# Patient Record
Sex: FEMALE | Race: WHITE | NOT HISPANIC OR LATINO | Employment: OTHER | URBAN - NONMETROPOLITAN AREA
[De-identification: names, ages, dates, MRNs, and addresses within clinical notes are randomized per-mention and may not be internally consistent; named-entity substitution may affect disease eponyms.]

---

## 2023-04-19 PROBLEM — R73.03 PRE-DIABETES: Status: ACTIVE | Noted: 2023-04-19

## 2023-04-19 PROBLEM — R07.2 PRECORDIAL PAIN: Status: ACTIVE | Noted: 2023-04-19

## 2023-04-19 PROBLEM — F17.200 SMOKER: Status: ACTIVE | Noted: 2023-04-19

## 2023-04-19 PROBLEM — E78.5 HYPERLIPIDEMIA LDL GOAL <100: Status: ACTIVE | Noted: 2023-04-19

## 2023-04-19 RX ORDER — ROSUVASTATIN CALCIUM 10 MG/1
TABLET, COATED ORAL NIGHTLY
COMMUNITY
Start: 2023-01-24

## 2023-04-20 ENCOUNTER — OFFICE VISIT (OUTPATIENT)
Dept: CARDIOLOGY | Facility: CLINIC | Age: 43
End: 2023-04-20
Payer: COMMERCIAL

## 2023-04-20 VITALS
DIASTOLIC BLOOD PRESSURE: 77 MMHG | HEIGHT: 62 IN | WEIGHT: 191.8 LBS | SYSTOLIC BLOOD PRESSURE: 118 MMHG | HEART RATE: 81 BPM | OXYGEN SATURATION: 98 % | BODY MASS INDEX: 35.3 KG/M2

## 2023-04-20 DIAGNOSIS — R00.2 PALPITATIONS: ICD-10-CM

## 2023-04-20 DIAGNOSIS — E78.5 DYSLIPIDEMIA: Primary | ICD-10-CM

## 2023-04-20 DIAGNOSIS — R07.9 CHEST PAIN, UNSPECIFIED TYPE: ICD-10-CM

## 2023-04-20 DIAGNOSIS — R06.02 SHORTNESS OF BREATH: ICD-10-CM

## 2023-04-20 PROCEDURE — 99204 OFFICE O/P NEW MOD 45 MIN: CPT | Performed by: PHYSICIAN ASSISTANT

## 2023-04-20 NOTE — LETTER
April 20, 2023     Ector Zhao MD  [address] [place] [id number]    Patient: Elly Carnes   YOB: 1980   Date of Visit: 4/20/2023       Dear Ector Zhao MD    Elly Carnes was in my office today. Below is a copy of my note.    If you have questions, please do not hesitate to call me. I look forward to following Elly along with you.         Sincerely,        NOHEMY Herrera        CC: No Recipients    Problem list     Subjective    Elly Carnes is a 43 y.o. female     Chief Complaint   Patient presents with   • Hyperlipidemia     PT IS SENSITIVE TO MOST MEDICATIONS SHE HAS TRIED SHE STATES.        HPI    Patient is a 43-year-old female who presents to the office to be evaluated.  She has no history of coronary disease or structural heart disease.    Patient has had issues with dyslipidemia and apparently was recently placed on Crestor because of severe dyslipidemia.  Apparently Repatha was attempted but was not approved by insurance.    She has been having occasional chest discomfort feeling an atypical sharp or aching sensation in the left upper chest near her shoulder.  This seems to be sporadic without any exacerbating factor.  She does not describe any chest pressure or referral pain.    She has a degree of dyspnea that is mild if she exerts for extended levels but can do her normal routine baseline activity without issues.  She does not describe PND orthopnea.    She occasionally may sense a fluttering in her chest.  She does not describe any associated dizziness, presyncope or syncope.  She is stable otherwise.      Current Outpatient Medications on File Prior to Visit   Medication Sig Dispense Refill   • rosuvastatin (CRESTOR) 10 MG tablet Every Night.       No current facility-administered medications on file prior to visit.       Antihistamines, loratadine-type; Benadryl [diphenhydramine]; and Shellfish-derived products    Past Medical  "History:   Diagnosis Date   • Chest pain    • Hyperlipidemia LDL goal <100    • Pre-diabetes        Social History     Socioeconomic History   • Marital status: Significant Other   Tobacco Use   • Smoking status: Every Day     Types: Cigarettes   • Smokeless tobacco: Never   Substance and Sexual Activity   • Alcohol use: Never   • Drug use: Never       Family History   Problem Relation Age of Onset   • Arthritis Mother    • Stroke Father    • Hypertension Father    • Cancer Father    • COPD Father    • Coronary artery disease Father        Review of Systems   Constitutional: Negative for appetite change, chills and fever.   HENT: Negative.  Negative for dental problem, drooling, ear pain, facial swelling, sinus pressure, sneezing, sore throat and tinnitus.    Eyes: Negative for pain, redness, itching and visual disturbance.   Respiratory: Positive for shortness of breath. Negative for cough, choking and wheezing.    Cardiovascular: Positive for chest pain, palpitations and leg swelling.   Gastrointestinal: Negative for blood in stool, constipation, diarrhea and nausea.   Genitourinary: Negative.  Negative for difficulty urinating.   Musculoskeletal: Negative for arthralgias, back pain and neck pain.   Skin: Negative for rash and wound.   Neurological: Positive for numbness (LEGS). Negative for dizziness and weakness.   Psychiatric/Behavioral: Positive for sleep disturbance.       Objective    Vitals:    04/20/23 1436   BP: 118/77   Pulse: 81   SpO2: 98%   Weight: 87 kg (191 lb 12.8 oz)   Height: 157.5 cm (62\")      /77   Pulse 81   Ht 157.5 cm (62\")   Wt 87 kg (191 lb 12.8 oz)   SpO2 98%   BMI 35.08 kg/m²     Lab Results (most recent)       None            Physical Exam  Vitals and nursing note reviewed.   Constitutional:       General: She is not in acute distress.     Appearance: Normal appearance. She is well-developed.   HENT:      Head: Normocephalic and atraumatic.   Eyes:      General: No scleral " icterus.        Right eye: No discharge.         Left eye: No discharge.      Conjunctiva/sclera: Conjunctivae normal.   Neck:      Vascular: No carotid bruit.   Cardiovascular:      Rate and Rhythm: Normal rate and regular rhythm.      Heart sounds: Normal heart sounds. No murmur heard.    No friction rub. No gallop.   Pulmonary:      Effort: Pulmonary effort is normal. No respiratory distress.      Breath sounds: Normal breath sounds. No wheezing or rales.   Chest:      Chest wall: No tenderness.   Musculoskeletal:      Right lower leg: No edema.      Left lower leg: No edema.   Skin:     General: Skin is warm and dry.      Coloration: Skin is not pale.      Findings: No erythema or rash.   Neurological:      Mental Status: She is alert and oriented to person, place, and time.      Cranial Nerves: No cranial nerve deficit.   Psychiatric:         Behavior: Behavior normal.         Procedure    Procedures      Assessment & Plan     Problems Addressed this Visit          Cardiac and Vasculature    Dyslipidemia - Primary    Relevant Orders    Adult Transthoracic Echo Complete W/ Cont if Necessary Per Protocol    Treadmill Stress Test    Comprehensive Metabolic Panel    Lipid Panel    Chest pain    Relevant Orders    Adult Transthoracic Echo Complete W/ Cont if Necessary Per Protocol    Treadmill Stress Test    Comprehensive Metabolic Panel    Lipid Panel    Palpitations    Relevant Orders    Adult Transthoracic Echo Complete W/ Cont if Necessary Per Protocol    Treadmill Stress Test    Comprehensive Metabolic Panel    Lipid Panel       Pulmonary and Pneumonias    Shortness of breath    Relevant Orders    Adult Transthoracic Echo Complete W/ Cont if Necessary Per Protocol    Treadmill Stress Test    Comprehensive Metabolic Panel    Lipid Panel     Diagnoses         Codes Comments    Dyslipidemia    -  Primary ICD-10-CM: E78.5  ICD-9-CM: 272.4     Chest pain, unspecified type     ICD-10-CM: R07.9  ICD-9-CM: 786.50      Shortness of breath     ICD-10-CM: R06.02  ICD-9-CM: 786.05     Palpitations     ICD-10-CM: R00.2  ICD-9-CM: 785.1             Recommendation  1.  Patient is a 43-year-old female who presents to the office to be evaluated with complaints of chest pain, dyspnea and has occasional palpitations.  I will schedule for testing to evaluate.    2.  Regular treadmill stress test will be ordered for risk stratification in a patient with chest pain, tobacco use, and significant dyslipidemia.    3.  Echo to evaluate LV systolic and diastolic function, valvular structures etc.    4.  Palpitations are minimal at this point.  She does not describe any symptoms of malignant arrhythmia.  For now, I feel is reasonable to monitor.    5.  I want to reassess laboratories.  If her lipid status is severe or not controlled on current medical regimen is apparently increased doses of Crestor causes myalgias and she has already been on atorvastatin, hopefully We can get Repatha approved.    6.  We will see patient back for follow-up with above testing to recommend further.  She is to follow-up with primary as scheduled.          Elly Carnes  reports that she has been smoking cigarettes. She has never used smokeless tobacco..                Electronically signed by:

## 2023-04-20 NOTE — PROGRESS NOTES
Problem list     Subjective   Elly Carnes is a 43 y.o. female     Chief Complaint   Patient presents with   • Hyperlipidemia     PT IS SENSITIVE TO MOST MEDICATIONS SHE HAS TRIED SHE STATES.        HPI    Patient is a 43-year-old female who presents to the office to be evaluated.  She has no history of coronary disease or structural heart disease.    Patient has had issues with dyslipidemia and apparently was recently placed on Crestor because of severe dyslipidemia.  Apparently Repatha was attempted but was not approved by insurance.    She has been having occasional chest discomfort feeling an atypical sharp or aching sensation in the left upper chest near her shoulder.  This seems to be sporadic without any exacerbating factor.  She does not describe any chest pressure or referral pain.    She has a degree of dyspnea that is mild if she exerts for extended levels but can do her normal routine baseline activity without issues.  She does not describe PND orthopnea.    She occasionally may sense a fluttering in her chest.  She does not describe any associated dizziness, presyncope or syncope.  She is stable otherwise.      Current Outpatient Medications on File Prior to Visit   Medication Sig Dispense Refill   • rosuvastatin (CRESTOR) 10 MG tablet Every Night.       No current facility-administered medications on file prior to visit.       Antihistamines, loratadine-type; Benadryl [diphenhydramine]; and Shellfish-derived products    Past Medical History:   Diagnosis Date   • Chest pain    • Hyperlipidemia LDL goal <100    • Pre-diabetes        Social History     Socioeconomic History   • Marital status: Significant Other   Tobacco Use   • Smoking status: Every Day     Types: Cigarettes   • Smokeless tobacco: Never   Substance and Sexual Activity   • Alcohol use: Never   • Drug use: Never       Family History   Problem Relation Age of Onset   • Arthritis Mother    • Stroke Father    • Hypertension Father    •  "Cancer Father    • COPD Father    • Coronary artery disease Father        Review of Systems   Constitutional: Negative for appetite change, chills and fever.   HENT: Negative.  Negative for dental problem, drooling, ear pain, facial swelling, sinus pressure, sneezing, sore throat and tinnitus.    Eyes: Negative for pain, redness, itching and visual disturbance.   Respiratory: Positive for shortness of breath. Negative for cough, choking and wheezing.    Cardiovascular: Positive for chest pain, palpitations and leg swelling.   Gastrointestinal: Negative for blood in stool, constipation, diarrhea and nausea.   Genitourinary: Negative.  Negative for difficulty urinating.   Musculoskeletal: Negative for arthralgias, back pain and neck pain.   Skin: Negative for rash and wound.   Neurological: Positive for numbness (LEGS). Negative for dizziness and weakness.   Psychiatric/Behavioral: Positive for sleep disturbance.       Objective   Vitals:    04/20/23 1436   BP: 118/77   Pulse: 81   SpO2: 98%   Weight: 87 kg (191 lb 12.8 oz)   Height: 157.5 cm (62\")      /77   Pulse 81   Ht 157.5 cm (62\")   Wt 87 kg (191 lb 12.8 oz)   SpO2 98%   BMI 35.08 kg/m²     Lab Results (most recent)     None          Physical Exam  Vitals and nursing note reviewed.   Constitutional:       General: She is not in acute distress.     Appearance: Normal appearance. She is well-developed.   HENT:      Head: Normocephalic and atraumatic.   Eyes:      General: No scleral icterus.        Right eye: No discharge.         Left eye: No discharge.      Conjunctiva/sclera: Conjunctivae normal.   Neck:      Vascular: No carotid bruit.   Cardiovascular:      Rate and Rhythm: Normal rate and regular rhythm.      Heart sounds: Normal heart sounds. No murmur heard.    No friction rub. No gallop.   Pulmonary:      Effort: Pulmonary effort is normal. No respiratory distress.      Breath sounds: Normal breath sounds. No wheezing or rales.   Chest:      " Chest wall: No tenderness.   Musculoskeletal:      Right lower leg: No edema.      Left lower leg: No edema.   Skin:     General: Skin is warm and dry.      Coloration: Skin is not pale.      Findings: No erythema or rash.   Neurological:      Mental Status: She is alert and oriented to person, place, and time.      Cranial Nerves: No cranial nerve deficit.   Psychiatric:         Behavior: Behavior normal.         Procedure   Procedures       Assessment & Plan     Problems Addressed this Visit        Cardiac and Vasculature    Dyslipidemia - Primary    Relevant Orders    Adult Transthoracic Echo Complete W/ Cont if Necessary Per Protocol    Treadmill Stress Test    Comprehensive Metabolic Panel    Lipid Panel    Chest pain    Relevant Orders    Adult Transthoracic Echo Complete W/ Cont if Necessary Per Protocol    Treadmill Stress Test    Comprehensive Metabolic Panel    Lipid Panel    Palpitations    Relevant Orders    Adult Transthoracic Echo Complete W/ Cont if Necessary Per Protocol    Treadmill Stress Test    Comprehensive Metabolic Panel    Lipid Panel       Pulmonary and Pneumonias    Shortness of breath    Relevant Orders    Adult Transthoracic Echo Complete W/ Cont if Necessary Per Protocol    Treadmill Stress Test    Comprehensive Metabolic Panel    Lipid Panel   Diagnoses       Codes Comments    Dyslipidemia    -  Primary ICD-10-CM: E78.5  ICD-9-CM: 272.4     Chest pain, unspecified type     ICD-10-CM: R07.9  ICD-9-CM: 786.50     Shortness of breath     ICD-10-CM: R06.02  ICD-9-CM: 786.05     Palpitations     ICD-10-CM: R00.2  ICD-9-CM: 785.1           Recommendation  1.  Patient is a 43-year-old female who presents to the office to be evaluated with complaints of chest pain, dyspnea and has occasional palpitations.  I will schedule for testing to evaluate.    2.  Regular treadmill stress test will be ordered for risk stratification in a patient with chest pain, tobacco use, and significant  dyslipidemia.    3.  Echo to evaluate LV systolic and diastolic function, valvular structures etc.    4.  Palpitations are minimal at this point.  She does not describe any symptoms of malignant arrhythmia.  For now, I feel is reasonable to monitor.    5.  I want to reassess laboratories.  If her lipid status is severe or not controlled on current medical regimen is apparently increased doses of Crestor causes myalgias and she has already been on atorvastatin, hopefully We can get Repatha approved.    6.  We will see patient back for follow-up with above testing to recommend further.  She is to follow-up with primary as scheduled.           Elly Carnes  reports that she has been smoking cigarettes. She has never used smokeless tobacco..                Electronically signed by:

## 2023-04-25 ENCOUNTER — TELEPHONE (OUTPATIENT)
Dept: CARDIOLOGY | Facility: CLINIC | Age: 43
End: 2023-04-25
Payer: COMMERCIAL

## 2023-04-25 ENCOUNTER — PRIOR AUTHORIZATION (OUTPATIENT)
Dept: CARDIOLOGY | Facility: CLINIC | Age: 43
End: 2023-04-25
Payer: COMMERCIAL

## 2023-04-25 DIAGNOSIS — E78.5 DYSLIPIDEMIA: Primary | ICD-10-CM

## 2023-04-25 NOTE — TELEPHONE ENCOUNTER
Praluent is formulary with [employer] insurance. OK per Chalino Malloy PA-C. Patient given results. She will repeat labs in 6-8 weeks.     ----- Message from NOHEMY Melchor sent at 4/25/2023  1:18 PM EDT -----  Can we get Repatha on this patient

## 2023-04-25 NOTE — TELEPHONE ENCOUNTER
Prior authorization initiated and submitted through [employer]. Status pending.      The request has been approved. The authorization is effective for a maximum of 6 fills from 04/25/2023 to 10/24/2023, as long as the member is enrolled in their current health plan. The request was approved as submitted. A written notification letter will follow with additional details.    [facility] quoted $0, patient made aware.

## 2023-05-23 ENCOUNTER — HOSPITAL ENCOUNTER (OUTPATIENT)
Dept: CARDIOLOGY | Facility: HOSPITAL | Age: 43
Discharge: HOME | End: 2023-05-23
Payer: COMMERCIAL

## 2023-05-23 DIAGNOSIS — E78.5 DYSLIPIDEMIA: ICD-10-CM

## 2023-05-23 DIAGNOSIS — R06.02 SHORTNESS OF BREATH: ICD-10-CM

## 2023-05-23 DIAGNOSIS — R07.9 CHEST PAIN, UNSPECIFIED TYPE: ICD-10-CM

## 2023-05-23 DIAGNOSIS — R00.2 PALPITATIONS: ICD-10-CM

## 2023-05-23 LAB
BH CV ECHO MEAS - ACS: 1.81 CM
BH CV ECHO MEAS - AO MAX PG: 8 MMHG
BH CV ECHO MEAS - AO MEAN PG: 4.7 MMHG
BH CV ECHO MEAS - AO ROOT DIAM: 2.7 CM
BH CV ECHO MEAS - AO V2 MAX: 141.2 CM/SEC
BH CV ECHO MEAS - AO V2 VTI: 32.5 CM
BH CV ECHO MEAS - EDV(CUBED): 63.5 ML
BH CV ECHO MEAS - EDV(MOD-SP4): 90.7 ML
BH CV ECHO MEAS - EF(MOD-SP4): 63.1 %
BH CV ECHO MEAS - EF_3D-VOL: 44 %
BH CV ECHO MEAS - ESV(CUBED): 17 ML
BH CV ECHO MEAS - ESV(MOD-SP4): 33.5 ML
BH CV ECHO MEAS - FS: 35.6 %
BH CV ECHO MEAS - IVS/LVPW: 0.99 CM
BH CV ECHO MEAS - IVSD: 0.85 CM
BH CV ECHO MEAS - LA DIMENSION: 3.2 CM
BH CV ECHO MEAS - LAT PEAK E' VEL: 14.4 CM/SEC
BH CV ECHO MEAS - LV DIASTOLIC VOL/BSA (35-75): 48.4 CM2
BH CV ECHO MEAS - LV MASS(C)D: 101 GRAMS
BH CV ECHO MEAS - LV SYSTOLIC VOL/BSA (12-30): 17.9 CM2
BH CV ECHO MEAS - LVIDD: 4 CM
BH CV ECHO MEAS - LVIDS: 2.6 CM
BH CV ECHO MEAS - LVPWD: 0.85 CM
BH CV ECHO MEAS - MED PEAK E' VEL: 9.5 CM/SEC
BH CV ECHO MEAS - MV A MAX VEL: 79.7 CM/SEC
BH CV ECHO MEAS - MV DEC SLOPE: 643.2 CM/SEC2
BH CV ECHO MEAS - MV E MAX VEL: 95.1 CM/SEC
BH CV ECHO MEAS - MV E/A: 1.19
BH CV ECHO MEAS - RVDD: 3.1 CM
BH CV ECHO MEAS - SI(MOD-SP4): 30.5 ML/M2
BH CV ECHO MEAS - SV(MOD-SP4): 57.2 ML
BH CV ECHO MEASUREMENTS AVERAGE E/E' RATIO: 7.96
BH CV STRESS COMMENTS STAGE 1: NORMAL
BH CV STRESS DOSE REGADENOSON STAGE 1: 0.4
BH CV STRESS DURATION MIN STAGE 1: 3
BH CV STRESS DURATION SEC STAGE 1: 0
BH CV STRESS GRADE STAGE 1: 10
BH CV STRESS METS STAGE 1: 5
BH CV STRESS PROTOCOL 1: NORMAL
BH CV STRESS RECOVERY BP: NORMAL MMHG
BH CV STRESS RECOVERY HR: 100 BPM
BH CV STRESS SPEED STAGE 1: 1.7
BH CV STRESS STAGE 1: 1
LEFT ATRIUM VOLUME INDEX: 20.7 ML/M2
MAXIMAL PREDICTED HEART RATE: 178 BPM
MAXIMAL PREDICTED HEART RATE: 178 BPM
PERCENT MAX PREDICTED HR: 100.56 %
STRESS BASELINE BP: NORMAL MMHG
STRESS BASELINE HR: 96 BPM
STRESS PERCENT HR: 118 %
STRESS POST ESTIMATED WORKLOAD: 10.1 METS
STRESS POST EXERCISE DUR MIN: 9 MIN
STRESS POST PEAK BP: NORMAL MMHG
STRESS POST PEAK HR: 179 BPM
STRESS TARGET HR: 151 BPM
STRESS TARGET HR: 151 BPM

## 2023-05-23 PROCEDURE — 93017 CV STRESS TEST TRACING ONLY: CPT

## 2023-05-23 PROCEDURE — 93306 TTE W/DOPPLER COMPLETE: CPT

## 2023-05-25 ENCOUNTER — TELEPHONE (OUTPATIENT)
Dept: CARDIOLOGY | Facility: CLINIC | Age: 43
End: 2023-05-25
Payer: COMMERCIAL

## 2023-05-25 NOTE — TELEPHONE ENCOUNTER
STRESS  Pt notified of no acute findings. Provider will discuss results at f/u. Pt reminded of appt date and time.  ----- Message from NOHEMY Melchor sent at 5/24/2023  3:36 PM EDT -----  Routine follow-up

## 2023-05-31 LAB
BH CV ECHO MEAS - ACS: 1.81 CM
BH CV ECHO MEAS - AO MAX PG: 8 MMHG
BH CV ECHO MEAS - AO MEAN PG: 4.7 MMHG
BH CV ECHO MEAS - AO ROOT DIAM: 2.7 CM
BH CV ECHO MEAS - AO V2 MAX: 141.2 CM/SEC
BH CV ECHO MEAS - AO V2 VTI: 32.5 CM
BH CV ECHO MEAS - EDV(CUBED): 63.5 ML
BH CV ECHO MEAS - EDV(MOD-SP4): 90.7 ML
BH CV ECHO MEAS - EF(MOD-SP4): 63.1 %
BH CV ECHO MEAS - EF_3D-VOL: 44 %
BH CV ECHO MEAS - ESV(CUBED): 17 ML
BH CV ECHO MEAS - ESV(MOD-SP4): 33.5 ML
BH CV ECHO MEAS - FS: 35.6 %
BH CV ECHO MEAS - IVS/LVPW: 0.99 CM
BH CV ECHO MEAS - IVSD: 0.85 CM
BH CV ECHO MEAS - LA DIMENSION: 3.2 CM
BH CV ECHO MEAS - LAT PEAK E' VEL: 14.4 CM/SEC
BH CV ECHO MEAS - LV DIASTOLIC VOL/BSA (35-75): 48.4 CM2
BH CV ECHO MEAS - LV MASS(C)D: 101 GRAMS
BH CV ECHO MEAS - LV SYSTOLIC VOL/BSA (12-30): 17.9 CM2
BH CV ECHO MEAS - LVIDD: 4 CM
BH CV ECHO MEAS - LVIDS: 2.6 CM
BH CV ECHO MEAS - LVPWD: 0.85 CM
BH CV ECHO MEAS - MED PEAK E' VEL: 9.5 CM/SEC
BH CV ECHO MEAS - MV A MAX VEL: 79.7 CM/SEC
BH CV ECHO MEAS - MV DEC SLOPE: 643.2 CM/SEC2
BH CV ECHO MEAS - MV E MAX VEL: 95.1 CM/SEC
BH CV ECHO MEAS - MV E/A: 1.19
BH CV ECHO MEAS - RVDD: 3.1 CM
BH CV ECHO MEAS - SI(MOD-SP4): 30.5 ML/M2
BH CV ECHO MEAS - SV(MOD-SP4): 57.2 ML
BH CV ECHO MEASUREMENTS AVERAGE E/E' RATIO: 7.96
LEFT ATRIUM VOLUME INDEX: 20.7 ML/M2
MAXIMAL PREDICTED HEART RATE: 178 BPM
STRESS TARGET HR: 151 BPM

## 2023-06-01 ENCOUNTER — TELEPHONE (OUTPATIENT)
Dept: CARDIOLOGY | Facility: CLINIC | Age: 43
End: 2023-06-01

## 2023-06-01 NOTE — TELEPHONE ENCOUNTER
For the HUB to read to pt:    ECHO  Called patient to notify of no acute findings or abnormalities. Keep follow up as scheduled. If you have any problem between now and then give our office a call.   ----- Message from NOHEMY Melchor sent at 6/1/2023  1:55 PM EDT -----  Routine follow-up

## 2023-08-24 ENCOUNTER — HOSPITAL ENCOUNTER (OUTPATIENT)
Dept: MRI IMAGING | Facility: HOSPITAL | Age: 43
Discharge: HOME | End: 2023-08-24
Admitting: NURSE PRACTITIONER
Payer: COMMERCIAL

## 2023-08-24 DIAGNOSIS — M62.50 MUSCULAR ATROPHY, UNSPECIFIED SITE: ICD-10-CM

## 2023-08-24 DIAGNOSIS — R53.83 FATIGUE, UNSPECIFIED TYPE: ICD-10-CM

## 2023-08-24 DIAGNOSIS — R29.898 WEAKNESS OF BOTH LEGS: ICD-10-CM

## 2023-08-24 DIAGNOSIS — R93.0 ABNORMAL MRI OF HEAD: ICD-10-CM

## 2023-08-24 DIAGNOSIS — R20.0 BILATERAL NUMBNESS AND TINGLING OF ARMS AND LEGS: ICD-10-CM

## 2023-08-24 DIAGNOSIS — R20.2 BILATERAL NUMBNESS AND TINGLING OF ARMS AND LEGS: ICD-10-CM

## 2023-08-24 DIAGNOSIS — R29.898 WEAKNESS OF BOTH LEGS: Primary | ICD-10-CM

## 2023-08-24 PROCEDURE — 70553 MRI BRAIN STEM W/O & W/DYE: CPT

## 2023-08-24 PROCEDURE — 0 GADOBENATE DIMEGLUMINE 529 MG/ML SOLUTION: Performed by: NURSE PRACTITIONER

## 2023-08-24 PROCEDURE — A9577 INJ MULTIHANCE: HCPCS | Performed by: NURSE PRACTITIONER

## 2023-08-24 RX ADMIN — GADOBENATE DIMEGLUMINE 15 ML: 529 INJECTION, SOLUTION INTRAVENOUS at 12:34

## 2023-10-18 ENCOUNTER — PROCEDURE VISIT (OUTPATIENT)
Dept: NEUROLOGY | Facility: CLINIC | Age: 43
End: 2023-10-18
Payer: COMMERCIAL

## 2023-10-18 VITALS — HEIGHT: 62 IN | BODY MASS INDEX: 35.29 KG/M2

## 2023-10-18 DIAGNOSIS — R20.2 BILATERAL NUMBNESS AND TINGLING OF ARMS AND LEGS: ICD-10-CM

## 2023-10-18 DIAGNOSIS — R29.898 WEAKNESS OF BOTH LEGS: ICD-10-CM

## 2023-10-18 DIAGNOSIS — M62.50 MUSCULAR ATROPHY, UNSPECIFIED SITE: ICD-10-CM

## 2023-10-18 DIAGNOSIS — R93.0 ABNORMAL MRI OF HEAD: ICD-10-CM

## 2023-10-18 DIAGNOSIS — R20.0 BILATERAL NUMBNESS AND TINGLING OF ARMS AND LEGS: ICD-10-CM

## 2023-10-18 NOTE — PROGRESS NOTES
[facility]   Electrodiagnostic Laboratory    Nerve Conduction & EMG Report        Patient:   Elly Carnes   Patient ID: [id number]   YOB: 1980  Sex:   female      Exam Physician:  Davis Long MD  Refer Physician:  LIZANDRO Celeste    Electromyogram and Nerve Conduction Velocity Procedure Note    Hx: 43 y.o. right handed female with complaint of numbness involving the the upper and lower extremities and weakness involving the the upper and lower extremities. Symptoms have been present for several years and were provoked by activity. Significant past medical history includes nothing suggestive of neuropathy Family history no family history of nerve or muscle disease.    Exam: Motor power is normal. There is no atrophy. There are no fasciculations. Deep tendon reflexes are present and symmetrical. Sensory exam is normal.      Edx studies of the B UE and LE were performed to evaluate for peripheral neuropathy.      NCS Examination   For sensory nerve conduction studies, the amplitude is measured peak-to-peak, the latency reported is the distal peak latency, and the conduction velocity, if measured, is determined from onset latencies and is over the forearm.   For motor nerve conduction studies, the amplitude is measured baseline-to-peak, the latency reported is the distal onset latency, the conduction velocity is calculated over the forearm, and the F wave latency is the minimum latency.   Unless otherwise noted, the hand temperature was monitored continuously and remained between 32°C and 36°C during the performance of the NCSs.            Nerve Conduction Studies  Anti Sensory Summary Table     Stim Site NR Norm Peak (ms) O-P Amp (µV) Norm O-P Amp Onset (ms) Site1 Site2 Delta-0 (ms) Dist (cm) Ar (m/s) Norm Ar (m/s)   Left Median Anti Sensory (2nd Digit)   Wrist    <3.6 45.9 >10 2.3 Wrist 2nd Digit 2.3 14.0 61    Right Median Anti Sensory (2nd Digit)   Wrist    <3.6  43.4 >10 2.6 Wrist 2nd Digit 2.6 14.0 54    Left Radial Anti Sensory (Base 1st Digit)   Wrist    <3.1 36.9  1.1 Wrist Base 1st Digit 1.1 0.0     Right Radial Anti Sensory (Base 1st Digit)   Wrist    <3.1 31.9  1.1 Wrist Base 1st Digit 1.1 0.0     Left Sural Anti Sensory (Lat Mall)   Calf    <4.0 5.6 >5.0 1.8 Calf Lat Mall 1.8 14.0 78    Right Sural Anti Sensory (Lat Mall)   Calf    <4.0 33.8 >5.0 1.6 Calf Lat Mall 1.6 14.0 88    Left Ulnar Anti Sensory (5th Digit)   Wrist    <3.7 39.2 >15.0 2.1 Wrist 5th Digit 2.1 0.0     Right Ulnar Anti Sensory (5th Digit)   Wrist    <3.7 24.7 >15.0 2.0 Wrist 5th Digit 2.0 0.0       Motor Summary Table     Stim Site NR Onset (ms) Norm Onset (ms) O-P Amp (mV) Norm O-P Amp Site1 Site2 Delta-0 (ms) Dist (cm) Ar (m/s) Norm Ar (m/s)   Left Fibular Motor (Ext Dig Brev)   Ankle    3.2 <6.1 4.9 >2.5 B Fib Ankle 6.6 34.0 52 >40   B Fib    9.8  4.3  Poplt B Fib 1.6 9.0 56 >40   Poplt    11.4  3.9          Right Fibular Motor (Ext Dig Brev)   Ankle    3.5 <6.1 3.8 >2.5 B Fib Ankle 6.4 30.0 47 >40   B Fib    9.9  3.4  Poplt B Fib 1.7 9.0 53 >40   Poplt    11.6  3.5          Left Median Motor (Abd Poll Brev)   Wrist    3.3 <4.2 7.2 >5 Elbow Wrist 3.5 19.0 54 >50   Elbow    6.8  4.4          Right Median Motor (Abd Poll Brev)   Wrist    3.4 <4.2 6.5 >5 Elbow Wrist 3.6 19.0 53 >50   Elbow    7.0  1.1          Left Tibial Motor (Abd Girard Brev)   Ankle    3.8 <6.1 10.0 >3.0 Knee Ankle 8.9 38.0 43 >40   Knee    12.7  5.3          Right Tibial Motor (Abd Girard Brev)   Ankle    4.3 <6.1 6.8 >3.0 Knee Ankle 7.2 39.0 54 >40   Knee    11.5  6.5          Left Ulnar Motor (Abd Dig Minimi)   Wrist    2.8 <4.2 9.7 >3 B Elbow Wrist 3.5 21.0 60 >53   B Elbow    6.3  6.1  A Elbow B Elbow 1.5 9.0 60 >53   A Elbow    7.8  8.2          Right Ulnar Motor (Abd Dig Minimi)   Wrist    2.7 <4.2 8.3 >3 B Elbow Wrist 4.0 21.0 53 >53   B Elbow    6.7  5.2  A Elbow B Elbow 1.6 9.0 56 >53   A Elbow    8.3  7.7             F Wave Studies     NR F-Lat (ms) Lat Norm (ms) L-R F-Lat (ms) L-R Lat Norm   Left Fibular (Mrkrs) (EDB)      50.16 <60 1.09 <5.1   Right Fibular (Mrkrs) (EDB)      51.25 <60 1.09 <5.1   Left Median (Mrkrs) (Abd Poll Brev)      25.00 <33 1.80 <2.2   Right Median (Mrkrs) (Abd Poll Brev)      26.80 <33 1.80 <2.2   Left Tibial (Mrkrs) (Abd Hallucis)      57.66 <61 0.41 <5.7   Right Tibial (Mrkrs) (Abd Hallucis)      57.25 <61 0.41 <5.7   Left Ulnar (Mrkrs) (Abd Dig Min)      25.94 <36 *3.83 <2.5   Right Ulnar (Mrkrs) (Abd Dig Min)      29.77 <36 *3.83 <2.5     H Reflex Studies     NR H-Lat (ms) L-R H-Lat (ms) L-R Lat Norm   Left Tibial (Mrkrs) (Gastroc)      30.85 0.00 <2.0   Right Tibial (Mrkrs) (Gastroc)      30.85 0.00 <2.0         EMG Examination   The study was performed with a concentric needle electrode. Fibrillation and fasciculation activity is graded from none (0) to continuous (4+). The configuration and recruitment pattern of motor unit action potentials under voluntary control, if not normal, are described below       Side Muscle Nerve Root Ins Act Fibs Psw Amp Dur Poly Recrt Int Pat Comment   Right Deltoid Axillary C5-6 Nml Nml Nml Nml Nml 0 Nml Nml    Right Triceps Radial C6-7-8 Nml Nml Nml Nml Nml 0 Nml Nml    Right Biceps Musculocut C5-6 Nml Nml Nml Nml Nml 0 Nml Nml    Right PronatorTeres Median C6-7 Nml Nml Nml Nml Nml 0 Nml Nml    Right 1stDorInt Ulnar C8-T1 Nml Nml Nml Nml Nml 0 Nml Nml    Left Deltoid Axillary C5-6 Nml Nml Nml Nml Nml 0 Nml Nml    Left Triceps Radial C6-7-8 Nml Nml Nml Nml Nml 0 Nml Nml    Left Biceps Musculocut C5-6 Nml Nml Nml Nml Nml 0 Nml Nml    Left PronatorTeres Median C6-7 Nml Nml Nml Nml Nml 0 Nml Nml    Left 1stDorInt Ulnar C8-T1 Nml Nml Nml Nml Nml 0 Nml Nml    Right AntTibialis Dp Br Fibular L4-5 Nml Nml Nml Nml Nml 0 Nml Nml    Right Gastroc Tibial S1-2 Nml Nml Nml Nml Nml 0 Nml Nml    Right BicepsFemL Sciatic L5-S2 Nml Nml Nml Nml Nml 0 Nml Nml    Right VastusMed  Femoral L2-4 Nml Nml Nml Nml Nml 0 Nml Nml    Right Iliopsoas Femoral L2-3 Nml Nml Nml Nml Nml 0 Nml Nml    Left AntTibialis Dp Br Fibular L4-5 Nml Nml Nml Nml Nml 0 Nml Nml    Left Gastroc Tibial S1-2 Nml Nml Nml Nml Nml 0 Nml Nml    Left BicepsFemL Sciatic L5-S2 Nml Nml Nml Nml Nml 0 Nml Nml    Left VastusMed Femoral L2-4 Nml Nml Nml Nml Nml 0 Nml Nml    Left Iliopsoas Femoral L2-3 Nml Nml Nml Nml Nml 0 Nml Nml        Nerve Conduction Studies  Anti Sensory Left/Right Comparison     Stim Site L Lat (ms) R Lat (ms) L-R Lat (ms) L Amp (µV) R Amp (µV) L-R Amp (%) Site1 Site2 L Ar (m/s) R Ar (m/s) L-R Ar (m/s)   Median Anti Sensory (2nd Digit)   Wrist 2.3 2.6 0.3 45.9 43.4 5.4 Wrist 2nd Digit 61 54 7   Radial Anti Sensory (Base 1st Digit)   Wrist 1.1 1.1 0.0 36.9 31.9 13.6 Wrist Base 1st Digit      Sural Anti Sensory (Lat Mall)   Calf 1.8 1.6 0.2 5.6 33.8 83.4 Calf Lat Mall 78 88 10   Ulnar Anti Sensory (5th Digit)   Wrist 2.1 2.0 0.1 39.2 24.7 37.0 Wrist 5th Digit        Motor Left/Right Comparison     Stim Site L Lat (ms) R Lat (ms) L-R Lat (ms) L Amp (mV) R Amp (mV) L-R Amp (%) Site1 Site2 L Ar (m/s) R Ar (m/s) L-R Ar (m/s)   Fibular Motor (Ext Dig Brev)   Ankle 3.2 3.5 0.3 4.9 3.8 22.4 B Fib Ankle 52 47 5   B Fib 9.8 9.9 0.1 4.3 3.4 20.9 Poplt B Fib 56 53 3   Poplt 11.4 11.6 0.2 3.9 3.5 10.3        Median Motor (Abd Poll Brev)   Wrist 3.3 3.4 0.1 7.2 6.5 9.7 Elbow Wrist 54 53 1   Elbow 6.8 7.0 0.2 4.4 1.1 75.0        Tibial Motor (Abd Girard Brev)   Ankle 3.8 4.3 0.5 10.0 6.8 32.0 Knee Ankle 43 54 *11   Knee 12.7 11.5 1.2 5.3 6.5 18.5        Ulnar Motor (Abd Dig Minimi)   Wrist 2.8 2.7 0.1 9.7 8.3 14.4 B Elbow Wrist 60 53 7   B Elbow 6.3 6.7 0.4 6.1 5.2 14.8 A Elbow B Elbow 60 56 4   A Elbow 7.8 8.3 0.5 8.2 7.7 6.1              Waveforms:                                                                                       NCV FINDINGS:  All nerve conduction studies (as indicated in the following tables) were  within normal limits.  All remaining left vs. right side differences were within normal limits.  All F Wave latencies were within normal limits.  All remaining F Wave left vs. right side latency differences were within normal limits.  All H Reflex left vs. right side latency differences were within normal limits.      EMG FINDINGS:    All examined muscles (as indicated in the following table) showed no evidence of electrical instability.       Conclusion: Normal NCV and EMG of the upper extremities, right lower extremity, and left lower extremity          Instrument used:  Teca Synergy        Performed by:          Davis Long MD

## 2023-11-09 ENCOUNTER — OFFICE VISIT (OUTPATIENT)
Dept: CARDIOLOGY | Facility: CLINIC | Age: 43
End: 2023-11-09
Payer: COMMERCIAL

## 2023-11-09 VITALS
WEIGHT: 196.6 LBS | HEART RATE: 75 BPM | DIASTOLIC BLOOD PRESSURE: 78 MMHG | HEIGHT: 62 IN | OXYGEN SATURATION: 98 % | BODY MASS INDEX: 36.18 KG/M2 | SYSTOLIC BLOOD PRESSURE: 109 MMHG

## 2023-11-09 DIAGNOSIS — R07.2 PRECORDIAL PAIN: Primary | ICD-10-CM

## 2023-11-09 DIAGNOSIS — R06.02 SHORTNESS OF BREATH: ICD-10-CM

## 2023-11-09 DIAGNOSIS — F17.200 SMOKER: ICD-10-CM

## 2023-11-09 DIAGNOSIS — R00.2 PALPITATIONS: ICD-10-CM

## 2023-11-09 DIAGNOSIS — E78.5 DYSLIPIDEMIA: ICD-10-CM

## 2023-11-09 PROCEDURE — 99214 OFFICE O/P EST MOD 30 MIN: CPT | Performed by: INTERNAL MEDICINE

## 2023-11-09 NOTE — PROGRESS NOTES
Subjective   Elly Carnes is a 43 y.o. female     Chief Complaint   Patient presents with    Follow-up     Here for 6 mo. Testing f/u    Hyperlipidemia    Palpitations       PROBLEM LIST:     Chest pain  1.1 Stress test, 5-,   Shortness of breath  Palpitations  Hyperlipidemia  Smoker  Echo, 5-, EF 55-60%, trivial MR / AI        Specialty Problems          Cardiology Problems    Palpitations             HPI:    Ms. Carnes returns for follow-up on the above.    She has been stable from the cardiac perspective.  She has occasional episodes of chest discomfort atypical for angina versus nonanginal in nature.  Her functional capacity has remained unchanged despite multiple neurologic issues including what she feels to be significant muscle loss.  She has no orthopnea, PND, or lower extremity edema.  She senses no palpitations.  She has not been presyncopal or syncopal.  She does not claudicate or have symptoms of TIA or stroke.    Stress test demonstrated above average functional capacity (9 minutes on a Pascual protocol) with physiologic heart rate and blood pressure responses to stress, no chest pain, and no EKG evidence of ischemia or dysrhythmia.    Echocardiogram demonstrated normal LV systolic and diastolic function, normal LV filling pressures, normal pulmonary pressures.  There is no valve, pericardial, or great vessel pathology identified.    The patient is tolerating PCSK9 inhibitor therapy without difficulty.  LDL of 148 previously on statin was on to 76 on combined therapy.                        PRIOR MEDICATIONS    Current Outpatient Medications on File Prior to Visit   Medication Sig Dispense Refill    Alirocumab 75 MG/ML solution auto-injector Inject 1 mL under the skin into the appropriate area as directed Every 14 (Fourteen) Days. 2.24 mL 11    B Complex-C-Folic Acid (multivitamin b complex with c) tablet Take 1 tablet by mouth Daily.      meloxicam (MOBIC) 7.5 MG tablet Take 1  tablet by mouth Daily As Needed.      methocarbamol (ROBAXIN) 750 MG tablet Take 1 tablet by mouth 3 (Three) Times a Day As Needed.      rosuvastatin (CRESTOR) 10 MG tablet Every Night.       No current facility-administered medications on file prior to visit.       ALLERGIES:    Antihistamines, loratadine-type; Benadryl [diphenhydramine]; Epinephrine; and Shellfish-derived products    PAST MEDICAL HISTORY:    Past Medical History:   Diagnosis Date    Chest pain     CTS (carpal tunnel syndrome) 06/2017    had EMG    Familial hypercholesteremia     Headache, tension-type     Hyperlipidemia LDL goal <100     Peripheral neuropathy     Pre-diabetes     Weakness of both legs 10/18/2023       SURGICAL HISTORY:    History reviewed. No pertinent surgical history.    SOCIAL HISTORY:    Social History     Socioeconomic History    Marital status: Significant Other   Tobacco Use    Smoking status: Every Day     Packs/day: 0.50     Years: 26.00     Additional pack years: 0.00     Total pack years: 13.00     Types: Cigarettes     Passive exposure: Current    Smokeless tobacco: Never   Vaping Use    Vaping Use: Never used   Substance and Sexual Activity    Alcohol use: Yes     Alcohol/week: 4.0 standard drinks of alcohol     Types: 4 Drinks containing 0.5 oz of alcohol per week    Drug use: Never    Sexual activity: Yes     Partners: Male     Birth control/protection: Condom       FAMILY HISTORY:    Family History   Problem Relation Age of Onset    Arthritis Mother     Stroke Father     Hypertension Father     Cancer Father     COPD Father     Coronary artery disease Father     Seizures Father        Review of Systems   Constitutional:  Positive for fatigue.   HENT: Negative.     Eyes:  Positive for visual disturbance (glasses).   Respiratory: Negative.     Cardiovascular:  Positive for chest pain (occas.) and leg swelling (occas.). Negative for palpitations.   Gastrointestinal: Negative.    Endocrine: Negative.    Genitourinary:  "Negative.    Musculoskeletal:  Positive for arthralgias and myalgias.   Skin: Negative.    Allergic/Immunologic: Positive for food allergies (shellfish).   Neurological: Negative.    Hematological:  Bruises/bleeds easily.   Psychiatric/Behavioral:  Positive for sleep disturbance.        VISIT VITALS:  Vitals:    11/09/23 0932   BP: 109/78   BP Location: Left arm   Patient Position: Sitting   Pulse: 75   SpO2: 98%   Weight: 89.2 kg (196 lb 9.6 oz)   Height: 157.5 cm (62.01\")      /78 (BP Location: Left arm, Patient Position: Sitting)   Pulse 75   Ht 157.5 cm (62.01\")   Wt 89.2 kg (196 lb 9.6 oz)   SpO2 98%   BMI 35.95 kg/m²     RECENT LABS:    Objective       Physical Exam  Vitals and nursing note reviewed.   Constitutional:       General: She is not in acute distress.     Appearance: She is well-developed.   HENT:      Head: Normocephalic and atraumatic.   Eyes:      Conjunctiva/sclera: Conjunctivae normal.      Pupils: Pupils are equal, round, and reactive to light.   Neck:      Vascular: No carotid bruit, hepatojugular reflux or JVD.      Trachea: No tracheal deviation.      Comments: Nl. Carotid upstrokes  Cardiovascular:      Rate and Rhythm: Normal rate and regular rhythm.      Pulses:           Radial pulses are 2+ on the right side and 2+ on the left side.      Heart sounds: S1 normal and S2 normal. Murmur heard.      No friction rub. S4 sounds present. No S3 sounds.      Comments: 1/6 TR  No MR  No AI  Pulmonary:      Effort: Pulmonary effort is normal.      Breath sounds: Normal breath sounds. No wheezing, rhonchi or rales.      Comments: Nl. Expir. Phase  Nl. Breath sound intensity    Abdominal:      General: Bowel sounds are normal. There is no distension or abdominal bruit.      Palpations: Abdomen is soft. There is no mass.      Tenderness: There is no abdominal tenderness. There is no guarding or rebound.      Comments: No organomegaly   Musculoskeletal:         General: No tenderness or " deformity. Normal range of motion.      Cervical back: Normal range of motion and neck supple.      Right lower leg: No edema.      Left lower leg: No edema.      Comments: LLE, no edema, palpable pedal pulses  RLE, no edema, palpable pedal pulses   Skin:     General: Skin is warm and dry.      Coloration: Skin is not pale.      Findings: No erythema or rash.   Neurological:      Mental Status: She is alert and oriented to person, place, and time.   Psychiatric:         Behavior: Behavior normal.         Thought Content: Thought content normal.         Judgment: Judgment normal.         Procedures      Assessment & Plan   #1.  Chest pain atypical for angina.  The patient had low risk stress testing and has stable high-level functional capacity.  We will continue risk factor modification but will reserve further risk assessment for significant change in symptoms.    2.  Palpitations by history.  The patient states she has had no symptoms.  We will continue to monitor.    3.  Dyslipidemia.  LDL cholesterol is at goal.    4.  Multiple neurologic issues currently being   Diagnosis Plan   1. Precordial pain        2. Dyslipidemia        3. Palpitations        4. Shortness of breath        5. Smoker            No follow-ups on file.         Elly Carnes  reports that she has been smoking cigarettes. She has a 13.00 pack-year smoking history. She has been exposed to tobacco smoke. She has never used smokeless tobacco.. I have educated her on the risk of diseases from using tobacco products such as cancer, COPD, and heart disease.     I advised her to quit and she is not willing to quit.    I spent  3  minutes counseling the patient.          Class 2 Severe Obesity (BMI >=35 and <=39.9). Obesity-related health conditions include the following: dyslipidemias. Obesity is  to be assessed at today's visit . BMI is  pcp addressing . We discussed portion control and increasing exercise.               Electronically signed  by:    Scribed for Rod Kerns MD by Nyla Daly LPN on November 9, 2023  at 09:36 EST    I, Rod Kerns MD personally performed the services described in this documentation as scribed by the above named individual in my presence, and it is both accurate and complete. November 9, 2023 09:36 EST      Dictated Utilizing Dragon Dictation: Part of this note may be an electronic transcription/translation of spoken language to printed text using the Dragon Dictation System.

## 2023-11-17 ENCOUNTER — OFFICE VISIT (OUTPATIENT)
Dept: NEUROLOGY | Facility: CLINIC | Age: 43
End: 2023-11-17
Payer: COMMERCIAL

## 2023-11-17 VITALS
SYSTOLIC BLOOD PRESSURE: 118 MMHG | DIASTOLIC BLOOD PRESSURE: 68 MMHG | OXYGEN SATURATION: 98 % | HEIGHT: 62 IN | WEIGHT: 196.4 LBS | HEART RATE: 100 BPM | BODY MASS INDEX: 36.14 KG/M2

## 2023-11-17 DIAGNOSIS — R20.2 BILATERAL NUMBNESS AND TINGLING OF ARMS AND LEGS: ICD-10-CM

## 2023-11-17 DIAGNOSIS — R29.898 WEAKNESS OF BOTH LEGS: Primary | ICD-10-CM

## 2023-11-17 DIAGNOSIS — R20.0 BILATERAL NUMBNESS AND TINGLING OF ARMS AND LEGS: ICD-10-CM

## 2023-11-17 PROCEDURE — 99214 OFFICE O/P EST MOD 30 MIN: CPT | Performed by: PSYCHIATRY & NEUROLOGY

## 2023-11-17 PROCEDURE — 1160F RVW MEDS BY RX/DR IN RCRD: CPT | Performed by: PSYCHIATRY & NEUROLOGY

## 2023-11-17 PROCEDURE — 1159F MED LIST DOCD IN RCRD: CPT | Performed by: PSYCHIATRY & NEUROLOGY

## 2023-11-17 RX ORDER — PRAMIPEXOLE DIHYDROCHLORIDE 0.25 MG/1
.25-.5 TABLET ORAL NIGHTLY
Qty: 90 TABLET | Refills: 2 | Status: SHIPPED | OUTPATIENT
Start: 2023-11-17 | End: 2024-11-16

## 2023-11-17 NOTE — PROGRESS NOTES
Subjective   Patient ID: Elly Carnes is a 43 y.o. female     Chief Complaint   Patient presents with    numbness and tingling     All 4 extremities        History of Present Illness    43 y.o. female returns in follow up.  Last visit on 10/18/23 performed EMG/NCS.    EMG/NCS - Normal NCV and EMG of the upper extremities, right lower extremity, and left lower extremity     MRI B/C/T, my review of films, 4/12/23 few scattered T2 lesions     Labs RICK, ESR, RF, CRP, CMP,     Treadmill stress test -  Above average functional capacity without chest pain.     HA frequency ss 3 - 4 days a week.      Taking OTC meds 3 - 4 days a week.    Past Medical History:   Diagnosis Date    Chest pain     CTS (carpal tunnel syndrome) 06/2017    had EMG    Familial hypercholesteremia     Headache, tension-type     Hyperlipidemia LDL goal <100     Peripheral neuropathy     Pre-diabetes     Weakness of both legs 10/18/2023     Family History   Problem Relation Age of Onset    Arthritis Mother     Stroke Father     Hypertension Father     Cancer Father     COPD Father     Coronary artery disease Father     Seizures Father      Social History     Socioeconomic History    Marital status: Significant Other   Tobacco Use    Smoking status: Every Day     Packs/day: 0.50     Years: 26.00     Additional pack years: 0.00     Total pack years: 13.00     Types: Cigarettes     Passive exposure: Current    Smokeless tobacco: Never   Vaping Use    Vaping Use: Never used   Substance and Sexual Activity    Alcohol use: Yes     Alcohol/week: 4.0 standard drinks of alcohol     Types: 4 Drinks containing 0.5 oz of alcohol per week    Drug use: Never    Sexual activity: Yes     Partners: Male     Birth control/protection: Condom       Review of Systems   Constitutional:  Positive for fatigue. Negative for activity change and unexpected weight change.   HENT:  Negative for tinnitus and trouble swallowing.    Eyes:  Negative for photophobia and visual  "disturbance.   Respiratory:  Negative for apnea, cough and choking.    Cardiovascular:  Negative for leg swelling.   Gastrointestinal:  Negative for nausea and vomiting.   Endocrine: Negative for cold intolerance and heat intolerance.   Genitourinary:  Negative for difficulty urinating, frequency, menstrual problem and urgency.   Musculoskeletal:  Positive for myalgias. Negative for back pain, gait problem and neck pain.   Skin:  Negative for color change and rash.   Allergic/Immunologic: Negative for immunocompromised state.   Neurological:  Positive for weakness. Negative for dizziness, tremors, seizures, syncope, facial asymmetry, speech difficulty, light-headedness, numbness and headaches.   Hematological:  Negative for adenopathy. Does not bruise/bleed easily.   Psychiatric/Behavioral:  Negative for behavioral problems, confusion, decreased concentration, hallucinations and sleep disturbance.        Objective     Vitals:    11/17/23 1334   BP: 118/68   Pulse: 100   SpO2: 98%   Weight: 89.1 kg (196 lb 6.4 oz)   Height: 157.5 cm (62\")       Neurologic Exam     Mental Status   Oriented to person, place, and time.   Speech: speech is normal   Level of consciousness: alert  Knowledge: good and consistent with education.   Normal comprehension.     Cranial Nerves   Cranial nerves II through XII intact.     CN II   Visual fields full to confrontation.   Visual acuity: normal  Right visual field deficit: none  Left visual field deficit: none     CN III, IV, VI   Pupils are equal, round, and reactive to light.  Extraocular motions are normal.   Nystagmus: none   Diplopia: none  Ophthalmoparesis: none  Upgaze: normal  Downgaze: normal  Conjugate gaze: present    CN V   Facial sensation intact.   Right corneal reflex: normal  Left corneal reflex: normal    CN VII   Right facial weakness: none  Left facial weakness: none    CN VIII   Hearing: intact    CN IX, X   Palate: symmetric  Right gag reflex: normal  Left gag " reflex: normal    CN XI   Right sternocleidomastoid strength: normal  Left sternocleidomastoid strength: normal    CN XII   Tongue: not atrophic  Fasciculations: absent  Tongue deviation: none    Motor Exam   Muscle bulk: normal  Overall muscle tone: normal    Strength   Strength 5/5 throughout.     Sensory Exam   Light touch normal.     Gait, Coordination, and Reflexes     Gait  Gait: normal    Tremor   Resting tremor: absent  Intention tremor: absent  Action tremor: absent    Reflexes   Reflexes 2+ except as noted.       Physical Exam  Eyes:      Extraocular Movements: EOM normal.      Pupils: Pupils are equal, round, and reactive to light.   Neurological:      Mental Status: She is oriented to person, place, and time.      Cranial Nerves: Cranial nerves 2-12 are intact.      Motor: Motor strength is normal.     Gait: Gait is intact.   Psychiatric:         Speech: Speech normal.         Lab on 06/21/2023   Component Date Value Ref Range Status    Total Cholesterol 06/21/2023 134  0 - 200 mg/dL Final    Triglycerides 06/21/2023 45  0 - 150 mg/dL Final    HDL Cholesterol 06/21/2023 48  40 - 60 mg/dL Final    LDL Cholesterol  06/21/2023 76  0 - 100 mg/dL Final    VLDL Cholesterol 06/21/2023 10  5 - 40 mg/dL Final    LDL/HDL Ratio 06/21/2023 1.60   Final         Assessment & Plan     Problem List Items Addressed This Visit          Musculoskeletal and Injuries    Weakness of both legs - Primary    Relevant Medications    methocarbamol (ROBAXIN) 750 MG tablet    pramipexole (Mirapex) 0.25 MG tablet       Symptoms and Signs    Bilateral numbness and tingling of arms and legs    Current Assessment & Plan     N/T/Itching     Start Mirapex                No follow-ups on file.

## 2023-11-21 ENCOUNTER — PATIENT ROUNDING (BHMG ONLY) (OUTPATIENT)
Dept: NEUROLOGY | Facility: CLINIC | Age: 43
End: 2023-11-21
Payer: COMMERCIAL

## 2023-11-21 NOTE — PROGRESS NOTES
A My-Chart message has been sent to the patient for PATIENT ROUNDING with [facility]

## 2024-01-25 ENCOUNTER — PRIOR AUTHORIZATION (OUTPATIENT)
Dept: CARDIOLOGY | Facility: CLINIC | Age: 44
End: 2024-01-25
Payer: COMMERCIAL

## 2024-01-25 NOTE — TELEPHONE ENCOUNTER
Prior authorization request received for Praluent. Authorization submitted through Cover My Meds. Status pending.      The request has been approved. The authorization is effective from 01/26/2024 to 01/25/2025, as long as the member is enrolled in their current health plan. The request was reviewed and approved by a licensed clinical pharmacist. A written notification letter will follow with additional details.

## 2024-02-12 ENCOUNTER — TELEPHONE (OUTPATIENT)
Dept: NEUROLOGY | Facility: CLINIC | Age: 44
End: 2024-02-12
Payer: COMMERCIAL

## 2024-03-14 ENCOUNTER — LAB (OUTPATIENT)
Dept: LAB | Facility: HOSPITAL | Age: 44
End: 2024-03-14
Payer: COMMERCIAL

## 2024-03-14 DIAGNOSIS — E78.5 DYSLIPIDEMIA: ICD-10-CM

## 2024-03-14 LAB
ALBUMIN SERPL-MCNC: 4.6 G/DL (ref 3.5–5.2)
ALP SERPL-CCNC: 75 U/L (ref 39–117)
ALT SERPL W P-5'-P-CCNC: 19 U/L (ref 1–33)
AST SERPL-CCNC: 14 U/L (ref 1–32)
BILIRUB CONJ SERPL-MCNC: <0.2 MG/DL (ref 0–0.3)
BILIRUB INDIRECT SERPL-MCNC: NORMAL MG/DL
BILIRUB SERPL-MCNC: 0.2 MG/DL (ref 0–1.2)
CHOLEST SERPL-MCNC: 131 MG/DL (ref 0–200)
HDLC SERPL-MCNC: 45 MG/DL (ref 40–60)
LDLC SERPL CALC-MCNC: 66 MG/DL (ref 0–100)
LDLC/HDLC SERPL: 1.41 {RATIO}
PROT SERPL-MCNC: 7 G/DL (ref 6–8.5)
TRIGL SERPL-MCNC: 112 MG/DL (ref 0–150)
VLDLC SERPL-MCNC: 20 MG/DL (ref 5–40)

## 2024-03-14 PROCEDURE — 36415 COLL VENOUS BLD VENIPUNCTURE: CPT

## 2024-03-14 PROCEDURE — 80061 LIPID PANEL: CPT

## 2024-03-14 PROCEDURE — 80076 HEPATIC FUNCTION PANEL: CPT

## 2024-03-21 ENCOUNTER — TELEPHONE (OUTPATIENT)
Dept: CARDIOLOGY | Facility: CLINIC | Age: 44
End: 2024-03-21
Payer: COMMERCIAL

## 2024-03-21 RX ORDER — ROSUVASTATIN CALCIUM 10 MG/1
10 TABLET, COATED ORAL NIGHTLY
Qty: 90 TABLET | Refills: 3 | Status: SHIPPED | OUTPATIENT
Start: 2024-03-21 | End: 2024-03-21

## 2024-03-21 NOTE — TELEPHONE ENCOUNTER
Caller: Elly Worthy    Relationship: Self    Best call back number: [phone]     What is the best time to reach you: ANYTIME    Who are you requesting to speak with (clinical staff, provider,  specific staff member): CLINICAL STAFF    Do you know the name of the person who called: ELLY WORTHY    What was the call regarding: PATIENT'S PHARMACY CALLED HER TO NOTIFY THAT HER CRESTOR PRESCRIPTION IS ABOUT TO . THIS MEDICATION WAS PRESCRIBED BY HER PCP.  SHE IS ALSO TAKING A PRALUENT INJECTION. THE PATIENT JUST HAD LABS DONE AND HER CHOLESTEROL IS GOING DOWN. SHE WOULD LIKE TO KNOW IF SHE NEEDS TO CONTINUE TAKING CRESTOR OR IF SHE CAN GO OFF OF IT FOR 6 MONTHS. SHE PREFERS THAT HER CARDIOLOGIST MAKE THIS DETERMINATION.

## 2024-03-21 NOTE — TELEPHONE ENCOUNTER
Per chart review, pt was last seen on 11/9/23. We have Crestor 10mg on their med list as historic and Praluent 75mg. Lipid panel from 3/14 shows:  Total Cholesterol  0 - 200 mg/dL 131 134   Triglycerides  0 - 150 mg/dL 112 45   HDL Cholesterol  40 - 60 mg/dL 45 48   LDL Cholesterol  0 - 100 mg/dL 66

## 2024-03-22 NOTE — TELEPHONE ENCOUNTER
Chalino Malloy PA Sawyers, Emily  Caller: Unspecified (Yesterday,  3:49 PM)  She on both of them when the labs were drawn.  If so, she would benefit from staying on both.  If not and she was only taking the shot, then I would just say Praluent.  Another option is that we could refer her to the lipid clinic.  Repatha may work better and she could stop her statin

## 2024-03-22 NOTE — TELEPHONE ENCOUNTER
Called pt, she stated she's been taking oral stain and inj. I advised her to stay on both, she verbalized understanding. She stated she picked up her medicine.

## 2024-11-11 ENCOUNTER — OFFICE VISIT (OUTPATIENT)
Dept: CARDIOLOGY | Facility: CLINIC | Age: 44
End: 2024-11-11
Payer: COMMERCIAL

## 2024-11-11 VITALS
BODY MASS INDEX: 37.02 KG/M2 | OXYGEN SATURATION: 97 % | WEIGHT: 201.2 LBS | SYSTOLIC BLOOD PRESSURE: 110 MMHG | DIASTOLIC BLOOD PRESSURE: 73 MMHG | HEART RATE: 81 BPM | HEIGHT: 62 IN

## 2024-11-11 DIAGNOSIS — R00.2 PALPITATIONS: ICD-10-CM

## 2024-11-11 DIAGNOSIS — R07.2 PRECORDIAL PAIN: Primary | ICD-10-CM

## 2024-11-11 DIAGNOSIS — F17.200 SMOKER: ICD-10-CM

## 2024-11-11 DIAGNOSIS — R06.02 SHORTNESS OF BREATH: ICD-10-CM

## 2024-11-11 DIAGNOSIS — R05.9 COUGH, UNSPECIFIED TYPE: ICD-10-CM

## 2024-11-11 DIAGNOSIS — R06.89 DECREASED BREATH SOUNDS: ICD-10-CM

## 2024-11-11 DIAGNOSIS — E78.5 DYSLIPIDEMIA: ICD-10-CM

## 2024-11-11 PROCEDURE — 93000 ELECTROCARDIOGRAM COMPLETE: CPT | Performed by: INTERNAL MEDICINE

## 2024-11-11 PROCEDURE — 99214 OFFICE O/P EST MOD 30 MIN: CPT | Performed by: INTERNAL MEDICINE

## 2024-11-11 RX ORDER — ROSUVASTATIN CALCIUM 10 MG/1
10 TABLET, FILM COATED ORAL DAILY
COMMUNITY

## 2024-11-11 RX ORDER — ALIROCUMAB 75 MG/ML
INJECTION, SOLUTION SUBCUTANEOUS
COMMUNITY

## 2024-11-11 NOTE — PROGRESS NOTES
Subjective   Elly Carnes is a 44 y.o. female     Chief Complaint   Patient presents with    Follow-up     Here for yearly f/u    Hyperlipidemia    Palpitations       PROBLEM LIST:     Chest pain  1.1 Stress test, 5-, above avg. Funct. Cap. No ectopy  Shortness of breath  Palpitations  Hyperlipidemia  Smoker  Echo, 5-, EF 55-60%, trivial MR / AI    Specialty Problems          Cardiology Problems    Palpitations             HPI:  Ms. Carnes returns for follow-up on the above.    She continues to experience chest discomfort.  Symptoms are unchanged from those which prompted stress testing last year.  Stress test in May 2023 demonstrated above average functional capacity with no symptoms, Physiologic heart rate and blood pressure responses to exercise, and no EKG changes.  She has no symptoms suggestive of CHF.  She no longer experiences palpitations.    Blood pressures have been well-controlled as have lipids.    The patient continues to have progressive muscle weakness and pain.  Neurologic evaluation demonstrated nonspecific intracranial abnormalities felt not typical of MS.  Her workup is ongoing.  She has had no new illnesses, injuries, or hospitalizations.                      PRIOR MEDICATIONS    Current Outpatient Medications on File Prior to Visit   Medication Sig Dispense Refill    B Complex-C-Folic Acid (multivitamin b complex with c) tablet Take 1 tablet by mouth Daily.      Crestor 10 MG tablet Take 1 tablet by mouth Daily.      meloxicam (MOBIC) 7.5 MG tablet Take 1 tablet by mouth Daily As Needed.      methocarbamol (ROBAXIN) 750 MG tablet Take 1 tablet by mouth 3 (Three) Times a Day As Needed.      Praluent 75 MG/ML solution auto-injector Every 14 (Fourteen) Days.      [DISCONTINUED] pramipexole (Mirapex) 0.25 MG tablet Take 1-2 tablets by mouth Every Night. 90 tablet 2     No current facility-administered medications on file prior to visit.       ALLERGIES:    Shellfish-derived  products; Antihistamines, loratadine-type; Benadryl [diphenhydramine]; and Epinephrine    PAST MEDICAL HISTORY:    Past Medical History:   Diagnosis Date    Chest pain     CTS (carpal tunnel syndrome) 06/2017    had EMG    Familial hypercholesteremia     Headache, tension-type     Hyperlipidemia LDL goal <100     Peripheral neuropathy     Pre-diabetes     Weakness of both legs 10/18/2023       SURGICAL HISTORY:    History reviewed. No pertinent surgical history.    SOCIAL HISTORY:    Social History     Socioeconomic History    Marital status: Significant Other   Tobacco Use    Smoking status: Every Day     Current packs/day: 0.50     Average packs/day: 0.5 packs/day for 26.0 years (13.0 ttl pk-yrs)     Types: Cigarettes     Passive exposure: Current    Smokeless tobacco: Never   Vaping Use    Vaping status: Never Used   Substance and Sexual Activity    Alcohol use: Yes     Alcohol/week: 4.0 standard drinks of alcohol     Types: 4 Drinks containing 0.5 oz of alcohol per week    Drug use: Never    Sexual activity: Yes     Partners: Male     Birth control/protection: Condom       FAMILY HISTORY:    Family History   Problem Relation Age of Onset    Arthritis Mother     Stroke Father     Hypertension Father     Cancer Father     COPD Father     Coronary artery disease Father     Seizures Father        Review of Systems   Constitutional:  Positive for fatigue (off and on).   Eyes:  Positive for visual disturbance (glasses).   Respiratory: Negative.     Cardiovascular:  Positive for chest pain (occas., unchanged from previous) and leg swelling (mildly). Negative for palpitations.   Gastrointestinal: Negative.    Endocrine: Negative.    Genitourinary: Negative.    Musculoskeletal:  Positive for myalgias and neck pain.   Skin: Negative.    Allergic/Immunologic: Positive for food allergies (shellfish).   Neurological:  Positive for weakness (overall) and headaches.   Hematological:  Bruises/bleeds easily.  "  Psychiatric/Behavioral:  Positive for sleep disturbance.        VISIT VITALS:  Vitals:    11/11/24 0900   BP: 110/73   BP Location: Left arm   Patient Position: Sitting   Pulse: 81   SpO2: 97%   Weight: 91.3 kg (201 lb 3.2 oz)   Height: 157.5 cm (62\")      /73 (BP Location: Left arm, Patient Position: Sitting)   Pulse 81   Ht 157.5 cm (62\")   Wt 91.3 kg (201 lb 3.2 oz)   SpO2 97%   BMI 36.80 kg/m²     RECENT LABS:    Objective       Physical Exam  Vitals and nursing note reviewed.   Constitutional:       General: She is not in acute distress.     Appearance: She is well-developed.   HENT:      Head: Normocephalic and atraumatic.   Eyes:      Conjunctiva/sclera: Conjunctivae normal.      Pupils: Pupils are equal, round, and reactive to light.   Neck:      Vascular: No carotid bruit, hepatojugular reflux or JVD.      Trachea: No tracheal deviation.      Comments: Nl. Carotid upstrokes  Cardiovascular:      Rate and Rhythm: Normal rate and regular rhythm.      Pulses:           Radial pulses are 2+ on the right side and 2+ on the left side.      Heart sounds: Normal heart sounds. Heart sounds are distant (S1 & S2). No murmur heard.     No friction rub. No S3 or S4 sounds.   Pulmonary:      Effort: Pulmonary effort is normal.      Breath sounds: Decreased breath sounds (mildly rt. base) present. No wheezing, rhonchi or rales.      Comments: Nl. Expir. Phase  Nl. Breath sound intensity    Abdominal:      General: Bowel sounds are normal. There is no distension or abdominal bruit.      Palpations: Abdomen is soft. There is no mass.      Tenderness: There is no abdominal tenderness. There is no guarding or rebound.      Comments: No organomegaly   Musculoskeletal:         General: No tenderness or deformity. Normal range of motion.      Cervical back: Normal range of motion and neck supple.      Right lower leg: No edema.      Left lower leg: No edema.      Comments: LLE, no edema, palpable pedal pulses  RLE, " no edema, palpable pedal pulses  Cap. Refill 3 sec. Bilat.    Skin:     General: Skin is warm and dry.      Coloration: Skin is not pale.      Findings: No erythema or rash.   Neurological:      Mental Status: She is alert and oriented to person, place, and time.   Psychiatric:         Behavior: Behavior normal.         Thought Content: Thought content normal.         Judgment: Judgment normal.           ECG 12 Lead    Date/Time: 11/11/2024 9:10 AM  Performed by: Rod Kerns MD    Authorized by: Rod Kerns MD  Comparison: compared with previous ECG from 1/24/2023  Comments: Sinus at 86 bpm.  Within normal limits.  No change from prior except for rate.            Assessment & Plan   #1.  The patient describes chest pain atypical for angina unchanged from that which prompted low risk stress testing.  We will continue to monitor.    2.  Dyslipidemia.  Lipids are well-controlled without adverse effects from medications.  Ms. Carnes would like to be considered for inclisiran therapy because of the need for fewer injections.  We will refer to disease management in that regard.    3.  Palpitations.  These remain quiescent.    4.  There is no dyspnea.  Patient's symptoms are relatively stable.  With decreased breath sounds on the right base I would like to obtain a chest x-ray to exclude fusion or other pathology.    5.  Ms. Carnes will follow with Dr. Elias per her instructions and we will plan on seeing her in follow-up on a yearly basis or on appearing basis as discussed.   Diagnosis Plan   1. Precordial pain        2. Dyslipidemia        3. Palpitations        4. Shortness of breath        5. Smoker            No follow-ups on file.         Elly Carnes  reports that she has been smoking cigarettes. She has a 13 pack-year smoking history. She has been exposed to tobacco smoke. She has never used smokeless tobacco. I have educated her on the risk of diseases from using tobacco products such  as cancer, COPD, and heart disease.     I advised her to quit and she is not willing to quit.    I spent 3  minutes counseling the patient.          Class 2 Severe Obesity (BMI >=35 and <=39.9). Obesity-related health conditions include the following: dyslipidemias. Obesity is  pcp addressing . BMI is  pcp addressing . We discussed portion control and increasing exercise.               Electronically signed by:    Scribed for Rod Kerns MD by Nyla Daly LPN on November 11, 2024  at 09:10 EST    I, Rod Kerns MD personally performed the services described in this documentation as scribed by the above named individual in my presence, and it is both accurate and complete. November 11, 2024 09:10 EST      Dictated Utilizing Dragon Dictation: Part of this note may be an electronic transcription/translation of spoken language to printed text using the Dragon Dictation System.

## 2024-11-14 ENCOUNTER — TELEPHONE (OUTPATIENT)
Dept: CARDIOLOGY | Facility: CLINIC | Age: 44
End: 2024-11-14
Payer: COMMERCIAL

## 2024-11-14 NOTE — TELEPHONE ENCOUNTER
[facility]  XRAY CHEST  Called patient to notify of no acute findings or abnormalities. Keep follow up as scheduled. If you have any problem between now and then give our office a call.   ----- Message from Rod Kerns sent at 11/14/2024 12:52 PM EST -----  Nl. CXR  ----- Message -----  From: Melissa Hoff RegSched Rep  Sent: 11/12/2024   3:26 PM EST  To: Rod Kerns MD

## 2024-12-06 ENCOUNTER — HOSPITAL ENCOUNTER (OUTPATIENT)
Dept: CARDIOLOGY | Facility: HOSPITAL | Age: 44
Discharge: HOME | End: 2024-12-06
Payer: COMMERCIAL

## 2024-12-06 ENCOUNTER — SPECIALTY PHARMACY (OUTPATIENT)
Dept: PHARMACY | Facility: HOSPITAL | Age: 44
End: 2024-12-06
Payer: COMMERCIAL

## 2024-12-06 DIAGNOSIS — E78.5 DYSLIPIDEMIA: Primary | ICD-10-CM

## 2024-12-06 RX ORDER — INCLISIRAN 284 MG/1.5ML
284 INJECTION, SOLUTION SUBCUTANEOUS
Qty: 1.5 ML | Refills: 1 | Status: SHIPPED | OUTPATIENT
Start: 2024-12-06 | End: 2024-12-06 | Stop reason: SDUPTHER

## 2024-12-06 RX ORDER — ERGOCALCIFEROL 1.25 MG/1
50000 CAPSULE, LIQUID FILLED ORAL
COMMUNITY

## 2024-12-06 NOTE — PROGRESS NOTES
[facility]  Lipid Management Program - Leqvio (Inclisiran)     Elly Carnes  is a 44 y.o. female referred by their provider, Dr. Kerns, to the Hyperlipidemia Patient Management program offered by [facility] for Lipid Management.  Elly Carnes is  treated for hyperlipidemia and currently takes Crestor and Praluent. Her last Praluent injection was about 3 weeks ago. She is interested in starting Leqvio to not have to give herself a shot every 2 weeks.  She cannot tolerate higher doses of statins because of muscle pain.    Drug Coverage   Prescription Drug Coverage    Relevant Past Medical History and Comorbidities  Past Medical History:   Diagnosis Date    Chest pain     CTS (carpal tunnel syndrome) 06/2017    had EMG    Familial hypercholesteremia     Headache, tension-type     Hyperlipidemia LDL goal <100     Peripheral neuropathy     Pre-diabetes     Weakness of both legs 10/18/2023     Social History     Socioeconomic History    Marital status: Significant Other   Tobacco Use    Smoking status: Every Day     Current packs/day: 0.50     Average packs/day: 0.5 packs/day for 26.0 years (13.0 ttl pk-yrs)     Types: Cigarettes     Passive exposure: Current    Smokeless tobacco: Never   Vaping Use    Vaping status: Never Used   Substance and Sexual Activity    Alcohol use: Yes     Alcohol/week: 4.0 standard drinks of alcohol     Types: 4 Drinks containing 0.5 oz of alcohol per week    Drug use: Never    Sexual activity: Yes     Partners: Male     Birth control/protection: Condom         Allergies  Known allergies and reactions were discussed with the patient. The patient's chart has been reviewed for  allergy information and updated as necessary.   Allergies   Allergen Reactions    Shellfish-Derived Products Anaphylaxis    Antihistamines, Loratadine-Type Unknown - High Severity    Benadryl [Diphenhydramine] Unknown - High Severity    Epinephrine Mental  Status Change       Relevant Laboratory Values  Lab Results   Component Value Date    CHOL 131 03/14/2024    TRIG 112 03/14/2024    HDL 45 03/14/2024    LDL 66 03/14/2024       Current Medication List    Current Outpatient Medications:     B Complex-C-Folic Acid (multivitamin b complex with c) tablet, Take 1 tablet by mouth Daily., Disp: , Rfl:     Crestor 10 MG tablet, Take 1 tablet by mouth Daily., Disp: , Rfl:     meloxicam (MOBIC) 7.5 MG tablet, Take 1 tablet by mouth Daily As Needed., Disp: , Rfl:     methocarbamol (ROBAXIN) 750 MG tablet, Take 1 tablet by mouth 3 (Three) Times a Day As Needed., Disp: , Rfl:     vitamin D (ERGOCALCIFEROL) 1.25 MG (65246 UT) capsule capsule, Take 1 capsule by mouth Every 7 (Seven) Days., Disp: , Rfl:     Inclisiran Sodium (Leqvio) 284 MG/1.5ML solution prefilled syringe, Inject 1.5 mL under the skin into the appropriate area as directed Every 3 (Three) Months., Disp: 1.5 mL, Rfl: 1    Medicines reviewed by Stephanie Marcial, PharmD on 12/6/2024 at 11:56 AM    Drug Interactions  None with Leqvio     Goals of Therapy  LDL Reduction     Medication Assessment & Plan  Patient will begin Leqvio 284mg SC once, 3 months later, then every 6 months.  Administered Leqvio 284mg SUBQ in back of right arm.    1st Dose: 12/6/24  (Lot # [id number] Exp 5/31/27)  Medication education provided, including:   Common Adverse Effects: Injection site reactions, arthralgias, & bronchitis.  Potential for allergic reaction.  Go to ED/call 911 with any S/Sx of allergic reaction.   Must be administered by a HCP.  If a dose is missed, please call to reschedule.   Expect LDL reduction, to help reduce cardiovascular risk.   At each visit, patient will need to stay a minimum of 15 min for monitoring   Lipid panel will be checked 4 to 12 weeks after starting therapy, order placed.   Patient will continue regular follow-up with cardiology  Will follow up in 3 months for next injection.     Stephanie Marcial  PharmD  12/6/2024  12:00 EST

## 2025-02-05 ENCOUNTER — LAB (OUTPATIENT)
Dept: LAB | Facility: HOSPITAL | Age: 45
End: 2025-02-05
Payer: COMMERCIAL

## 2025-02-05 DIAGNOSIS — E78.5 DYSLIPIDEMIA: ICD-10-CM

## 2025-02-05 LAB
CHOLEST SERPL-MCNC: 128 MG/DL (ref 0–200)
HDLC SERPL-MCNC: 44 MG/DL (ref 40–60)
LDLC SERPL CALC-MCNC: 66 MG/DL (ref 0–100)
LDLC/HDLC SERPL: 1.49 {RATIO}
TRIGL SERPL-MCNC: 93 MG/DL (ref 0–150)
VLDLC SERPL-MCNC: 18 MG/DL (ref 5–40)

## 2025-02-05 PROCEDURE — 36415 COLL VENOUS BLD VENIPUNCTURE: CPT

## 2025-02-05 PROCEDURE — 80061 LIPID PANEL: CPT

## 2025-03-04 ENCOUNTER — SPECIALTY PHARMACY (OUTPATIENT)
Dept: PHARMACY | Facility: HOSPITAL | Age: 45
End: 2025-03-04
Payer: COMMERCIAL

## 2025-03-04 NOTE — PROGRESS NOTES
Specialty Pharmacy Patient Management Program  Refill Outreach     Elly was contacted today regarding refills of their medication(s).    Refill Questions      Flowsheet Row Most Recent Value   Changes to allergies? No   Changes to medications? No   New conditions or infections since last clinic visit No   Unplanned office visit, urgent care, ED, or hospital admission in the last 4 weeks  No   How does patient/caregiver feel medication is working? Good   Financial problems or insurance changes  No   Since the previous refill, were any specialty medication doses or scheduled injections missed or delayed?  No   Does this patient require a clinical escalation to a pharmacist? No            Delivery Questions      Flowsheet Row Most Recent Value   Delivery method  at Pharmacy   Delivery address Prescription   Medication(s) being filled and delivered Inclisiran Sodium (Leqvio)   Copay verified? Yes   Copay amount $0   Copay form of payment No copayment ($0)   Signature Required No                 Follow-up: 180 day(s)     Micaela Best, Pharmacy Technician  3/4/2025  15:44 EST

## 2025-03-07 ENCOUNTER — HOSPITAL ENCOUNTER (OUTPATIENT)
Dept: CARDIOLOGY | Facility: HOSPITAL | Age: 45
Discharge: HOME | End: 2025-03-07
Payer: COMMERCIAL

## 2025-03-07 RX ORDER — PREGABALIN 75 MG/1
75 CAPSULE ORAL NIGHTLY PRN
COMMUNITY

## 2025-03-07 NOTE — PROGRESS NOTES
[facility]  Lipid Management Program - Leqvio (Inclisiran)     Elly Carnes  is a 45 y.o. female referred by their provider, Dr. Kerns, to the Hyperlipidemia Patient Management program offered by [facility] for Lipid Management.  Elly Carnes is  treated for hyperlipidemia and currently takes Crestor and Leqvio.  She denies any adverse effects with last Leqvio dose.     Drug Coverage   Prescription Drug Coverage    Relevant Past Medical History and Comorbidities  Past Medical History:   Diagnosis Date    Chest pain     CTS (carpal tunnel syndrome) 06/2017    had EMG    Familial hypercholesteremia     Headache, tension-type     Hyperlipidemia LDL goal <100     Peripheral neuropathy     Pre-diabetes     Weakness of both legs 10/18/2023     Social History     Socioeconomic History    Marital status: Significant Other   Tobacco Use    Smoking status: Every Day     Current packs/day: 0.50     Average packs/day: 0.5 packs/day for 26.0 years (13.0 ttl pk-yrs)     Types: Cigarettes     Passive exposure: Current    Smokeless tobacco: Never   Vaping Use    Vaping status: Never Used   Substance and Sexual Activity    Alcohol use: Yes     Alcohol/week: 4.0 standard drinks of alcohol     Types: 4 Drinks containing 0.5 oz of alcohol per week    Drug use: Never    Sexual activity: Yes     Partners: Male     Birth control/protection: Condom         Allergies  Known allergies and reactions were discussed with the patient. The patient's chart has been reviewed for  allergy information and updated as necessary.   Allergies   Allergen Reactions    Shellfish-Derived Products Anaphylaxis    Antihistamines, Loratadine-Type Unknown - High Severity    Benadryl [Diphenhydramine] Unknown - High Severity    Epinephrine Mental Status Change       Relevant Laboratory Values  Lab Results   Component Value Date    CHOL 128 02/05/2025    TRIG 93 02/05/2025    HDL 44 02/05/2025     LDL 66 02/05/2025       Current Medication List    Current Outpatient Medications:     B Complex-C-Folic Acid (multivitamin b complex with c) tablet, Take 1 tablet by mouth Daily., Disp: , Rfl:     Crestor 10 MG tablet, Take 1 tablet by mouth Daily., Disp: , Rfl:     Inclisiran Sodium 284 MG/1.5ML solution prefilled syringe, Inject 1.5 mL under the skin into the appropriate area as directed Every 6 (Six) Months., Disp: 1.5 mL, Rfl: 0    meloxicam (MOBIC) 7.5 MG tablet, Take 1 tablet by mouth Daily As Needed., Disp: , Rfl:     methocarbamol (ROBAXIN) 750 MG tablet, Take 1 tablet by mouth 3 (Three) Times a Day As Needed., Disp: , Rfl:     pregabalin (LYRICA) 75 MG capsule, Take 1 capsule by mouth At Night As Needed., Disp: , Rfl:     vitamin D (ERGOCALCIFEROL) 1.25 MG (51398 UT) capsule capsule, Take 1 capsule by mouth Every 7 (Seven) Days., Disp: , Rfl:     Medicines reviewed by Stephanie Marcial, PharmD on 3/7/2025 at 12:35 PM    Drug Interactions  None with Leqvio     Goals of Therapy  LDL Reduction     Medication Assessment & Plan  Patient will continue Leqvio 284mg SC every 6 months.  Administered Leqvio 284mg SUBQ in back of right arm.    1st Dose: 12/6/24  (Lot # [id number] Exp 5/31/27)    2nd dose: 3/7/25 Lot# [id number] Exp 5/31/26  Medication education provided at initial visit.   LDL at goal.   Patient will continue regular follow-up with cardiology  Will follow up in 6 months for next injection.     Stephanie Marcial, Miky  3/7/2025  12:39 EST

## 2025-03-21 RX ORDER — ROSUVASTATIN CALCIUM 10 MG/1
10 TABLET, COATED ORAL NIGHTLY
Qty: 90 TABLET | Refills: 2 | Status: SHIPPED | OUTPATIENT
Start: 2025-03-21